# Patient Record
Sex: FEMALE | Race: WHITE
[De-identification: names, ages, dates, MRNs, and addresses within clinical notes are randomized per-mention and may not be internally consistent; named-entity substitution may affect disease eponyms.]

---

## 2018-06-08 ENCOUNTER — HOSPITAL ENCOUNTER (EMERGENCY)
Dept: HOSPITAL 17 - NEPD | Age: 28
Discharge: HOME | End: 2018-06-08
Payer: SELF-PAY

## 2018-06-08 VITALS
DIASTOLIC BLOOD PRESSURE: 82 MMHG | RESPIRATION RATE: 16 BRPM | OXYGEN SATURATION: 100 % | HEART RATE: 95 BPM | TEMPERATURE: 98.8 F | SYSTOLIC BLOOD PRESSURE: 153 MMHG

## 2018-06-08 VITALS — HEIGHT: 62 IN | WEIGHT: 286.6 LBS | BODY MASS INDEX: 52.74 KG/M2

## 2018-06-08 DIAGNOSIS — K08.89: Primary | ICD-10-CM

## 2018-06-08 PROCEDURE — 99283 EMERGENCY DEPT VISIT LOW MDM: CPT

## 2018-06-08 NOTE — PD
HPI


Chief Complaint:  Oral / Dental Pain or Problem


Time Seen by Provider:  18:17


Travel History


International Travel<30 days:  No


Contact w/Intl Traveler<30days:  No


Traveled to known affect area:  No





History of Present Illness


HPI


27-year-old female complaint left sided toothache.  Patient states that she 

started having pain for the past several weeks.  Patient is awaiting 

appointment with a dentist.  Patient denies any fever chills.  Patient stated 

the pain is sharp pain localized to left lower gum area.  Patient denies any 

pain radiation.  Patient denies any fever chills.  On a scale from 1-10 the 

pain is a 9.





PFSH


Past Medical History


Pregnant?:  Not Pregnant





Social History


Tobacco Use:  No





Allergies-Medications


(Allergen,Severity, Reaction):  


Coded Allergies:  


     Penicillins (Verified  Allergy, Severe, Rash, 6/8/18)


Reported Meds & Prescriptions





Reported Meds & Active Scripts


Active


Ibuprofen 600 Mg Tab 600 Mg PO TID


Clindamycin (Clindamycin HCl) 150 Mg Cap 150 Mg PO QID








Review of Systems


General / Constitutional:  No: Fever


Eyes:  No: Visual changes


HENT:  No: Headaches


Cardiovascular:  No: Chest Pain or Discomfort


Respiratory:  No: Shortness of Breath


Gastrointestinal:  No: Abdominal Pain


Genitourinary:  No: Dysuria


Musculoskeletal:  No: Pain


Skin:  No Rash


Neurologic:  No: Weakness


Psychiatric:  No: Depression


Endocrine:  No: Polydipsia


Hematologic/Lymphatic:  No: Easy Bruising





Physical Exam


Narrative


GENERAL: Well-nourished, well-developed patient.


SKIN: Focused skin assessment warm/dry.


HEAD: Normocephalic.


EYES: No scleral icterus. No injection or drainage. 


NECK: Supple, trachea midline. No JVD or lymphadenopathy.


CARDIOVASCULAR: Regular rate and rhythm without murmurs, gallops, or rubs. 


RESPIRATORY: Breath sounds equal bilaterally. No accessory muscle use.


GASTROINTESTINAL: Abdomen soft, non-tender, nondistended. 


MUSCULOSKELETAL: No cyanosis, or edema. 


BACK: Nontender without obvious deformity. No CVA tenderness.


Patient has severe dental caries left lower gum area.  No soft tissue swelling 

noted.





Data


Data


Last Documented VS





Vital Signs








  Date Time  Temp Pulse Resp B/P (MAP) Pulse Ox O2 Delivery O2 Flow Rate FiO2


 


6/8/18 18:21   16     


 


6/8/18 17:21 98.8 95  153/82 (105) 100   








Orders





 Orders


Ed Discharge Order (6/8/18 18:29)








MDM


Medical Decision Making


Medical Screen Exam Complete:  Yes


Emergency Medical Condition:  Yes


Differential Diagnosis


Differential diagnosis: Dental pain, dental abscess.


Narrative Course


27-year-old female with dental pain.





Diagnosis





 Primary Impression:  


 Pain, dental


Patient Instructions:  General Instructions





***Additional Instructions:  


Take medication as directed.  Follow-up with a dentist.


***Med/Other Pt SpecificInfo:  Prescription(s) given


Scripts


Ibuprofen (Ibuprofen) 600 Mg Tab


600 MG PO TID for Pain, #60 TAB 0 Refills


   Prov: Edouard Vee MD         6/8/18 


Clindamycin (Clindamycin) 150 Mg Cap


150 MG PO QID for Infection, #80 CAP 0 Refills


   Prov: Edouard Vee MD         6/8/18


Disposition:  01 DISCHARGE HOME


Condition:  Stable











Edouard Vee MD Jun 8, 2018 18:28